# Patient Record
Sex: FEMALE | Race: BLACK OR AFRICAN AMERICAN | HISPANIC OR LATINO | URBAN - METROPOLITAN AREA
[De-identification: names, ages, dates, MRNs, and addresses within clinical notes are randomized per-mention and may not be internally consistent; named-entity substitution may affect disease eponyms.]

---

## 2021-05-20 ENCOUNTER — OFFICE VISIT (OUTPATIENT)
Dept: DENTISTRY | Facility: CLINIC | Age: 41
End: 2021-05-20

## 2021-05-20 VITALS — TEMPERATURE: 98 F | HEART RATE: 80 BPM | SYSTOLIC BLOOD PRESSURE: 112 MMHG | DIASTOLIC BLOOD PRESSURE: 75 MMHG

## 2021-05-20 DIAGNOSIS — K02.9 PAIN DUE TO DENTAL CARIES: Primary | ICD-10-CM

## 2021-05-20 PROCEDURE — D0140 LIMITED ORAL EVALUATION - PROBLEM FOCUSED: HCPCS | Performed by: DENTIST

## 2021-05-20 RX ORDER — IBUPROFEN 800 MG/1
TABLET ORAL EVERY 6 HOURS PRN
COMMUNITY

## 2021-05-20 RX ORDER — AMOXICILLIN 500 MG/1
500 CAPSULE ORAL EVERY 8 HOURS SCHEDULED
Qty: 21 CAPSULE | Refills: 0 | Status: SHIPPED | OUTPATIENT
Start: 2021-05-20 | End: 2021-05-27

## 2021-05-20 NOTE — PROGRESS NOTES
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

## 2021-05-20 NOTE — PATIENT INSTRUCTIONS
Please take the antibiotics 3xday until finished  Alternate between Ibuprofen and acetaminophen 1 pill each every 4-6 hours, please do not exceed allowed daily maximum limit  Eat soft foods , chew on the other side  Brush and floss daily  Call if symptoms continue   Return to continue treatment

## 2023-04-18 ENCOUNTER — APPOINTMENT (OUTPATIENT)
Dept: URGENT CARE | Age: 43
End: 2023-04-18

## 2023-04-18 ENCOUNTER — APPOINTMENT (OUTPATIENT)
Dept: RADIOLOGY | Age: 43
End: 2023-04-18

## 2023-04-18 DIAGNOSIS — S49.92XA INJURY OF LEFT SHOULDER, INITIAL ENCOUNTER: Primary | ICD-10-CM

## 2023-04-18 DIAGNOSIS — S49.92XA INJURY OF LEFT SHOULDER, INITIAL ENCOUNTER: ICD-10-CM

## 2023-04-24 ENCOUNTER — APPOINTMENT (OUTPATIENT)
Dept: URGENT CARE | Age: 43
End: 2023-04-24

## 2023-05-01 ENCOUNTER — APPOINTMENT (OUTPATIENT)
Dept: URGENT CARE | Age: 43
End: 2023-05-01

## 2023-05-15 ENCOUNTER — APPOINTMENT (OUTPATIENT)
Dept: URGENT CARE | Age: 43
End: 2023-05-15

## 2023-05-16 ENCOUNTER — APPOINTMENT (OUTPATIENT)
Dept: URGENT CARE | Age: 43
End: 2023-05-16

## 2023-06-07 ENCOUNTER — APPOINTMENT (OUTPATIENT)
Dept: URGENT CARE | Age: 43
End: 2023-06-07
Payer: OTHER MISCELLANEOUS

## 2023-06-07 PROCEDURE — 99213 OFFICE O/P EST LOW 20 MIN: CPT | Performed by: EMERGENCY MEDICINE

## 2023-06-21 ENCOUNTER — APPOINTMENT (OUTPATIENT)
Dept: URGENT CARE | Age: 43
End: 2023-06-21
Payer: OTHER MISCELLANEOUS

## 2023-06-21 PROCEDURE — 99214 OFFICE O/P EST MOD 30 MIN: CPT | Performed by: EMERGENCY MEDICINE

## 2023-06-23 ENCOUNTER — APPOINTMENT (OUTPATIENT)
Dept: URGENT CARE | Age: 43
End: 2023-06-23
Payer: OTHER MISCELLANEOUS

## 2023-06-23 PROCEDURE — 99213 OFFICE O/P EST LOW 20 MIN: CPT

## 2023-07-05 ENCOUNTER — APPOINTMENT (OUTPATIENT)
Dept: URGENT CARE | Age: 43
End: 2023-07-05
Payer: OTHER MISCELLANEOUS

## 2023-07-05 PROCEDURE — 99213 OFFICE O/P EST LOW 20 MIN: CPT | Performed by: NURSE PRACTITIONER

## 2023-07-18 ENCOUNTER — APPOINTMENT (OUTPATIENT)
Dept: URGENT CARE | Age: 43
End: 2023-07-18
Payer: OTHER MISCELLANEOUS

## 2023-07-18 PROCEDURE — 99214 OFFICE O/P EST MOD 30 MIN: CPT | Performed by: EMERGENCY MEDICINE

## 2023-07-28 ENCOUNTER — APPOINTMENT (OUTPATIENT)
Dept: URGENT CARE | Age: 43
End: 2023-07-28
Payer: OTHER MISCELLANEOUS

## 2023-07-28 PROCEDURE — 99213 OFFICE O/P EST LOW 20 MIN: CPT

## 2023-08-14 ENCOUNTER — OFFICE VISIT (OUTPATIENT)
Dept: OBGYN CLINIC | Facility: OTHER | Age: 43
End: 2023-08-14
Payer: OTHER MISCELLANEOUS

## 2023-08-14 VITALS
BODY MASS INDEX: 26.11 KG/M2 | SYSTOLIC BLOOD PRESSURE: 97 MMHG | HEART RATE: 73 BPM | HEIGHT: 60 IN | DIASTOLIC BLOOD PRESSURE: 67 MMHG | WEIGHT: 133 LBS

## 2023-08-14 DIAGNOSIS — M24.812 INTERNAL DERANGEMENT OF LEFT SHOULDER: Primary | ICD-10-CM

## 2023-08-14 PROCEDURE — 99204 OFFICE O/P NEW MOD 45 MIN: CPT | Performed by: ORTHOPAEDIC SURGERY

## 2023-08-14 RX ORDER — NAPROXEN 375 MG/1
375 TABLET ORAL 2 TIMES DAILY PRN
COMMUNITY
Start: 2023-06-24

## 2023-08-14 RX ORDER — TIZANIDINE 4 MG/1
TABLET ORAL
COMMUNITY
Start: 2023-05-17

## 2023-08-14 NOTE — LETTER
August 14, 2023     Patient: Jen Landin  YOB: 1980  Date of Visit: 8/14/2023      To Whom it May Concern:    Kingston Nguyen is under my professional care. Makayla Owen was seen in my office on 8/14/2023. Makayla Owen is to remain on restrictions of no lifting, pulling or pushing greater than 10 pounds with the left upper extremity until follow up after MRI arthrogram    If you have any questions or concerns, please don't hesitate to call.          Sincerely,          Sugey Garcia MD        CC: No Recipients

## 2023-08-14 NOTE — PROGRESS NOTES
Assessment  Diagnoses and all orders for this visit:    Internal derangement of left shoulder    Discussion and Plan:    · Explained to the patient that her plain MRI of the left shoulder is read as normal. Her examination today, symptoms and mechanism of injury are worrisome for a possible labral pathology which will not show up on a plain MRI study. At this time, due to persistent pain despite PT/HEP an MRI arthrogram of her left shoulder to further evaluate the labrum  · Continue current work restrictions as per occupational medicine. Note was provided today  · Follow up after MRI arthrogram for discussion of results and further treatment options based on these results    Subjective:   Patient ID: Lara Lamar is a 43 y.o. female    The patient presents today for an orthopedic surgery consultation visit at the request of Dr Theresa Nagy on 7/20/23 with a chief complaint of left shoulder pain. The pain began 4 month(s) ago and is associated with an acute injury. On 4/18/23 she was injured at work after pulling/lifting a tote. The patient describes the pain as aching and sharp in intensity,  intermittent, occurring with increasing frequency in timing, and localizes the pain to the  left subacromial joint, deltoid, biceps tendon. The pain is worse with overhead work, overuse and raising arm over head and relieved by rest, ice, avoiding the painful activities. The pain is not associated with numbness and tingling. The pain is not associated with constitutional symptoms. The patient is awoken at night by the pain. The patient has had prior treatment in the form of PT/HEP with minimal improvements.         The following portions of the patient's history were reviewed and updated as appropriate: allergies, current medications, past family history, past medical history, past social history, past surgical history and problem list.    Objective:  BP 97/67 (BP Location: Right arm, Patient Position: Sitting, Cuff Size: Adult)   Pulse 73   Ht 5' (1.524 m)   Wt 60.3 kg (133 lb)   BMI 25.97 kg/m²       Left Shoulder Exam     Range of Motion   The patient has normal left shoulder ROM. Muscle Strength   Abduction: 4/5     Tests   Apprehension: negative  Alex test: positive    Other   Erythema: absent  Sensation: normal  Pulse: present     Comments:  (+) [place]'s Test            Physical Exam  Constitutional:       Appearance: She is well-developed. Eyes:      Pupils: Pupils are equal, round, and reactive to light. Pulmonary:      Effort: Pulmonary effort is normal.      Breath sounds: Normal breath sounds. Skin:     General: Skin is warm and dry. Neurological:      Mental Status: She is alert and oriented to person, place, and time. Psychiatric:         Behavior: Behavior normal.         Thought Content: Thought content normal.         Judgment: Judgment normal.       I have personally reviewed pertinent films in PACS and my interpretation is as follows. X Ray Left Shoulder: No acute osseous abnormalities or degenerative changes. MRI Left Shoulder 6/2/23, report only available to view today reads: Rotator cuff tendonitis without rotator cuff tear. Mild biceps tendonitis without tear.      Scribe Attestation    I,:  Niko Lopez am acting as a scribe while in the presence of the attending physician.:       I,:  Caryn Hernandez MD personally performed the services described in this documentation    as scribed in my presence.:

## 2023-08-16 ENCOUNTER — APPOINTMENT (OUTPATIENT)
Dept: URGENT CARE | Age: 43
End: 2023-08-16
Payer: OTHER MISCELLANEOUS

## 2023-08-16 PROCEDURE — 99213 OFFICE O/P EST LOW 20 MIN: CPT | Performed by: EMERGENCY MEDICINE

## 2023-09-07 ENCOUNTER — TELEPHONE (OUTPATIENT)
Age: 43
End: 2023-09-07

## 2023-09-07 NOTE — TELEPHONE ENCOUNTER
Called. Wanted to let us know she is progressing most with motion but it is very slow. All strengthening exercises cause pain and she remains weak. Wanted to know why arthrogram isn't scheduled until end of Oct. Informed him it takes 6-8 weeks for openings with the radiology department. Trinidad Quijano she can try calling and see if there are cancellations. All questions answered. No further action required.
Caller: Miryam Bejarano w/ [address]     Doctor: Jessica Russ     Reason for call: Would like to speak with the clinical team about the patients care     Call back#: [phone]
None

## 2023-09-12 ENCOUNTER — TELEPHONE (OUTPATIENT)
Age: 43
End: 2023-09-12

## 2023-09-12 NOTE — TELEPHONE ENCOUNTER
Caller: Self    Doctor: Michelle Gage    Reason for call: Patient wants to see if imaging studies can be done sooner, warm transferred to procedure schedule    Call back#: [phone]

## 2023-09-14 NOTE — TELEPHONE ENCOUNTER
Caller: Self     Doctor: Traci Francis     Reason for call: Patient wants to see if imaging studies can be done sooner, warm transferred to procedure schedule     Call back#: [phone]

## 2023-09-21 ENCOUNTER — TELEPHONE (OUTPATIENT)
Age: 43
End: 2023-09-21

## 2023-09-21 NOTE — TELEPHONE ENCOUNTER
Caller: [facility]    Doctor: Rm Leonardo    Reason for call: Calling for a PT plan of care of be faxed over for the left shoulder.     Fax # [phone]    Call back#: [phone]

## 2023-09-22 NOTE — TELEPHONE ENCOUNTER
We haven't received a plan of care so therefore I cannot fax it back. Have them send it, you can stamp it and send it back.     thanks

## 2023-09-25 ENCOUNTER — TELEPHONE (OUTPATIENT)
Age: 43
End: 2023-09-25

## 2023-09-25 DIAGNOSIS — M24.812 INTERNAL DERANGEMENT OF LEFT SHOULDER: Primary | ICD-10-CM

## 2023-09-25 NOTE — TELEPHONE ENCOUNTER
Caller: George    Doctor: Goyo Watson    Reason for call: Request update scriptfor eval/treat.  Fax#[phone]    Call back#: [phone]

## 2023-09-27 ENCOUNTER — TELEPHONE (OUTPATIENT)
Age: 43
End: 2023-09-27

## 2023-09-27 NOTE — TELEPHONE ENCOUNTER
Caller: [facility]    Doctor/Office: Mg/ OSMIN        What needs to be faxed: PT script       Fax#: [phone]

## 2023-10-18 NOTE — NURSING NOTE
Call placed to patient to discuss upcoming appointment at [address] radiology department and complete consultation with patient in [address] [address]. Patient is having her left shoulder MRI arthrogram utilizing fluoroscopy guidance. Reviewed patient's allergies, no current anticoagulant medication present per patient, also discussed the pre and post procedure expectations. Reminded patient of location and time expected for procedure, Patient expressed understanding by verbalizing and repeating instructions.

## 2023-10-30 ENCOUNTER — HOSPITAL ENCOUNTER (OUTPATIENT)
Dept: RADIOLOGY | Facility: HOSPITAL | Age: 43
Discharge: HOME | End: 2023-10-30
Attending: ORTHOPAEDIC SURGERY
Payer: OTHER MISCELLANEOUS

## 2023-10-30 ENCOUNTER — HOSPITAL ENCOUNTER (OUTPATIENT)
Dept: RADIOLOGY | Facility: HOSPITAL | Age: 43
Discharge: HOME | End: 2023-10-30

## 2023-10-30 ENCOUNTER — HOSPITAL ENCOUNTER (OUTPATIENT)
Dept: RADIOLOGY | Facility: HOSPITAL | Age: 43
Discharge: HOME | End: 2023-10-30
Attending: ORTHOPAEDIC SURGERY | Admitting: RADIOLOGY
Payer: OTHER MISCELLANEOUS

## 2023-10-30 DIAGNOSIS — M24.812 INTERNAL DERANGEMENT OF LEFT SHOULDER: ICD-10-CM

## 2023-10-30 PROCEDURE — G1004 CDSM NDSC: HCPCS

## 2023-10-30 PROCEDURE — A9585 GADOBUTROL INJECTION: HCPCS | Performed by: RADIOLOGY

## 2023-10-30 PROCEDURE — 23350 INJECTION FOR SHOULDER X-RAY: CPT

## 2023-10-30 PROCEDURE — 73222 MRI JOINT UPR EXTREM W/DYE: CPT

## 2023-10-30 PROCEDURE — 77002 NEEDLE LOCALIZATION BY XRAY: CPT

## 2023-10-30 RX ORDER — GADOBUTROL 604.72 MG/ML
2 INJECTION INTRAVENOUS
Status: COMPLETED | OUTPATIENT
Start: 2023-10-30 | End: 2023-10-30

## 2023-10-30 RX ADMIN — IOHEXOL 2 ML: 300 INJECTION, SOLUTION INTRAVENOUS at 16:39

## 2023-10-30 RX ADMIN — GADOBUTROL 0.2 ML: 604.72 INJECTION INTRAVENOUS at 16:35
